# Patient Record
Sex: MALE | Race: OTHER | URBAN - METROPOLITAN AREA
[De-identification: names, ages, dates, MRNs, and addresses within clinical notes are randomized per-mention and may not be internally consistent; named-entity substitution may affect disease eponyms.]

---

## 2017-07-29 ENCOUNTER — EMERGENCY (EMERGENCY)
Facility: HOSPITAL | Age: 32
LOS: 0 days | Discharge: HOME | End: 2017-07-29
Attending: EMERGENCY MEDICINE
Payer: MEDICAID

## 2017-07-29 VITALS
OXYGEN SATURATION: 100 % | RESPIRATION RATE: 18 BRPM | SYSTOLIC BLOOD PRESSURE: 132 MMHG | HEART RATE: 85 BPM | DIASTOLIC BLOOD PRESSURE: 79 MMHG | TEMPERATURE: 99 F

## 2017-07-29 VITALS
RESPIRATION RATE: 18 BRPM | DIASTOLIC BLOOD PRESSURE: 81 MMHG | HEIGHT: 67 IN | TEMPERATURE: 98 F | HEART RATE: 108 BPM | WEIGHT: 160.06 LBS | SYSTOLIC BLOOD PRESSURE: 139 MMHG

## 2017-07-29 DIAGNOSIS — R19.7 DIARRHEA, UNSPECIFIED: ICD-10-CM

## 2017-07-29 DIAGNOSIS — K52.9 NONINFECTIVE GASTROENTERITIS AND COLITIS, UNSPECIFIED: ICD-10-CM

## 2017-07-29 DIAGNOSIS — R10.9 UNSPECIFIED ABDOMINAL PAIN: ICD-10-CM

## 2017-07-29 DIAGNOSIS — R11.2 NAUSEA WITH VOMITING, UNSPECIFIED: ICD-10-CM

## 2017-07-29 LAB
ALBUMIN SERPL ELPH-MCNC: 4.6 G/DL — SIGNIFICANT CHANGE UP (ref 3.3–5)
ALP SERPL-CCNC: 90 U/L — SIGNIFICANT CHANGE UP (ref 40–120)
ALT FLD-CCNC: 46 U/L — SIGNIFICANT CHANGE UP (ref 12–78)
ANION GAP SERPL CALC-SCNC: 11 MMOL/L — SIGNIFICANT CHANGE UP (ref 5–17)
AST SERPL-CCNC: 22 U/L — SIGNIFICANT CHANGE UP (ref 15–37)
BASOPHILS # BLD AUTO: 0.1 K/UL — SIGNIFICANT CHANGE UP (ref 0–0.2)
BASOPHILS NFR BLD AUTO: 0.6 % — SIGNIFICANT CHANGE UP (ref 0–2)
BILIRUB SERPL-MCNC: 1.5 MG/DL — HIGH (ref 0.2–1.2)
BUN SERPL-MCNC: 15 MG/DL — SIGNIFICANT CHANGE UP (ref 7–23)
CALCIUM SERPL-MCNC: 9.3 MG/DL — SIGNIFICANT CHANGE UP (ref 8.5–10.1)
CHLORIDE SERPL-SCNC: 102 MMOL/L — SIGNIFICANT CHANGE UP (ref 96–108)
CO2 SERPL-SCNC: 25 MMOL/L — SIGNIFICANT CHANGE UP (ref 22–31)
CREAT SERPL-MCNC: 1 MG/DL — SIGNIFICANT CHANGE UP (ref 0.5–1.3)
EOSINOPHIL # BLD AUTO: 0.1 K/UL — SIGNIFICANT CHANGE UP (ref 0–0.5)
EOSINOPHIL NFR BLD AUTO: 0.4 % — SIGNIFICANT CHANGE UP (ref 0–6)
GLUCOSE SERPL-MCNC: 119 MG/DL — HIGH (ref 70–99)
HCG SERPL-ACNC: <1 MIU/ML — SIGNIFICANT CHANGE UP
HCT VFR BLD CALC: 50 % — SIGNIFICANT CHANGE UP (ref 39–50)
HGB BLD-MCNC: 17.1 G/DL — HIGH (ref 13–17)
LACTATE SERPL-SCNC: 1.4 MMOL/L — SIGNIFICANT CHANGE UP (ref 0.7–2)
LIDOCAIN IGE QN: 189 U/L — SIGNIFICANT CHANGE UP (ref 73–393)
LYMPHOCYTES # BLD AUTO: 0.5 K/UL — LOW (ref 1–3.3)
LYMPHOCYTES # BLD AUTO: 3.6 % — LOW (ref 13–44)
MCHC RBC-ENTMCNC: 30 PG — SIGNIFICANT CHANGE UP (ref 27–34)
MCHC RBC-ENTMCNC: 34.3 GM/DL — SIGNIFICANT CHANGE UP (ref 32–36)
MCV RBC AUTO: 87.5 FL — SIGNIFICANT CHANGE UP (ref 80–100)
MONOCYTES # BLD AUTO: 0.7 K/UL — SIGNIFICANT CHANGE UP (ref 0–0.9)
MONOCYTES NFR BLD AUTO: 5.6 % — SIGNIFICANT CHANGE UP (ref 2–14)
NEUTROPHILS # BLD AUTO: 12.1 K/UL — HIGH (ref 1.8–7.4)
NEUTROPHILS NFR BLD AUTO: 89.8 % — HIGH (ref 43–77)
PLATELET # BLD AUTO: 276 K/UL — SIGNIFICANT CHANGE UP (ref 150–400)
POTASSIUM SERPL-MCNC: 3.9 MMOL/L — SIGNIFICANT CHANGE UP (ref 3.5–5.3)
POTASSIUM SERPL-SCNC: 3.9 MMOL/L — SIGNIFICANT CHANGE UP (ref 3.5–5.3)
PROT SERPL-MCNC: 8.9 GM/DL — HIGH (ref 6–8.3)
RBC # BLD: 5.7 M/UL — SIGNIFICANT CHANGE UP (ref 4.2–5.8)
RBC # FLD: 11.2 % — SIGNIFICANT CHANGE UP (ref 11–15)
SODIUM SERPL-SCNC: 138 MMOL/L — SIGNIFICANT CHANGE UP (ref 135–145)
WBC # BLD: 13.4 K/UL — HIGH (ref 3.8–10.5)
WBC # FLD AUTO: 13.4 K/UL — HIGH (ref 3.8–10.5)

## 2017-07-29 PROCEDURE — 99284 EMERGENCY DEPT VISIT MOD MDM: CPT

## 2017-07-29 RX ORDER — ONDANSETRON 8 MG/1
8 TABLET, FILM COATED ORAL ONCE
Qty: 0 | Refills: 0 | Status: COMPLETED | OUTPATIENT
Start: 2017-07-29 | End: 2017-07-29

## 2017-07-29 RX ORDER — FAMOTIDINE 10 MG/ML
20 INJECTION INTRAVENOUS ONCE
Qty: 0 | Refills: 0 | Status: COMPLETED | OUTPATIENT
Start: 2017-07-29 | End: 2017-07-29

## 2017-07-29 RX ORDER — SODIUM CHLORIDE 9 MG/ML
2000 INJECTION INTRAMUSCULAR; INTRAVENOUS; SUBCUTANEOUS ONCE
Qty: 0 | Refills: 0 | Status: COMPLETED | OUTPATIENT
Start: 2017-07-29 | End: 2017-07-29

## 2017-07-29 RX ORDER — DIPHENHYDRAMINE HCL 50 MG
50 CAPSULE ORAL ONCE
Qty: 0 | Refills: 0 | Status: COMPLETED | OUTPATIENT
Start: 2017-07-29 | End: 2017-07-29

## 2017-07-29 RX ADMIN — Medication 30 MILLILITER(S): at 21:42

## 2017-07-29 RX ADMIN — SODIUM CHLORIDE 1000 MILLILITER(S): 9 INJECTION INTRAMUSCULAR; INTRAVENOUS; SUBCUTANEOUS at 21:42

## 2017-07-29 RX ADMIN — Medication 50 MILLIGRAM(S): at 21:42

## 2017-07-29 RX ADMIN — ONDANSETRON 8 MILLIGRAM(S): 8 TABLET, FILM COATED ORAL at 21:42

## 2017-07-29 RX ADMIN — FAMOTIDINE 20 MILLIGRAM(S): 10 INJECTION INTRAVENOUS at 21:42

## 2017-07-29 NOTE — ED PROVIDER NOTE - MEDICAL DECISION MAKING DETAILS
Patient with gastroenteritis after eating old chicken.  VSS.  Labs reviewed, no significant findings other than mild leukocytosis.  The risks versus benefits of CT scan were discussed with patient/patient's family.  Based on patient's presentation at this time, it is agreed that emergent imaging with CT scan would do more harm than good due to possible unneeded radiation.  If symptoms should develop or worsen at any point to warrant emergent imaging however, patient is advised to return to the ER or seek medical care immediately.  Discussed results and outcome of today's visit with the patient.  Patient advised to please follow up with their primary care doctor within the next 24 hours and return to the Emergency Department for worsening symptoms or any other concerns.  Patient advised that their doctor may call  to follow up on the specific results of the tests performed today in the emergency department.   Patient appears well on discharge.

## 2017-07-29 NOTE — ED ADULT TRIAGE NOTE - CHIEF COMPLAINT QUOTE
"Vomiting and cramping" Reports having nausea, vomiting, and diarrhea starting after a meal at 1 pm with chills.

## 2017-07-29 NOTE — ED PROVIDER NOTE - OBJECTIVE STATEMENT
Pertinent PMH/PSH/FHx/SHx and Review of Systems contained within:  Patient presents to the ED for nausea, vomiting, diarrhea, and diffuse abdominal cramping x3 hours.  Says that he ate old [facility] meat which was left in his car overnight.  Denies any focal abdominal pain.  +Chills.    Relevant PMHx/SHx/SOCHx/FAMH:  Denies, No PSH  PMD: Dr. Jj  Patient denies EtOH/tobacco/illicit substance use.    ROS: No fever, No headache/photophobia/eye pain/changes in vision, No ear pain/sore throat/dysphagia, No chest pain/palpitations, no SOB/cough/wheeze/stridor, No melena, no dysuria/frequency/discharge, No neck/back pain, no rash, no changes in neurological status/function.

## 2017-07-30 RX ORDER — ONDANSETRON 8 MG/1
1 TABLET, FILM COATED ORAL
Qty: 6 | Refills: 0 | OUTPATIENT
Start: 2017-07-30 | End: 2017-08-01

## 2017-11-15 PROBLEM — Z00.00 ENCOUNTER FOR PREVENTIVE HEALTH EXAMINATION: Status: ACTIVE | Noted: 2017-11-15

## 2017-12-02 ENCOUNTER — OUTPATIENT (OUTPATIENT)
Dept: OUTPATIENT SERVICES | Facility: HOSPITAL | Age: 32
LOS: 1 days | End: 2017-12-02
Payer: MEDICAID

## 2017-12-02 ENCOUNTER — APPOINTMENT (OUTPATIENT)
Dept: UROLOGY | Facility: CLINIC | Age: 32
End: 2017-12-02
Payer: MEDICAID

## 2017-12-02 VITALS — HEART RATE: 62 BPM | DIASTOLIC BLOOD PRESSURE: 78 MMHG | SYSTOLIC BLOOD PRESSURE: 126 MMHG | RESPIRATION RATE: 16 BRPM

## 2017-12-02 PROCEDURE — 99204 OFFICE O/P NEW MOD 45 MIN: CPT | Mod: 25

## 2017-12-02 PROCEDURE — 76870 US EXAM SCROTUM: CPT

## 2017-12-02 PROCEDURE — 93975 VASCULAR STUDY: CPT | Mod: 26

## 2017-12-02 PROCEDURE — 76870 US EXAM SCROTUM: CPT | Mod: 26

## 2017-12-02 PROCEDURE — 93975 VASCULAR STUDY: CPT

## 2017-12-04 LAB
ALBUMIN SERPL ELPH-MCNC: 4.9 G/DL
ALP BLD-CCNC: 82 U/L
ALT SERPL-CCNC: 27 U/L
ANION GAP SERPL CALC-SCNC: 13 MMOL/L
APPEARANCE: CLEAR
AST SERPL-CCNC: 23 U/L
BACTERIA: NEGATIVE
BASOPHILS # BLD AUTO: 0.05 K/UL
BASOPHILS NFR BLD AUTO: 1 %
BILIRUB SERPL-MCNC: 1 MG/DL
BILIRUBIN URINE: NEGATIVE
BLOOD URINE: NEGATIVE
BUN SERPL-MCNC: 11 MG/DL
CALCIUM SERPL-MCNC: 10.5 MG/DL
CHLORIDE SERPL-SCNC: 99 MMOL/L
CHOLEST SERPL-MCNC: 230 MG/DL
CHOLEST/HDLC SERPL: 5.3 RATIO
CO2 SERPL-SCNC: 27 MMOL/L
COLOR: YELLOW
CREAT SERPL-MCNC: 0.82 MG/DL
EOSINOPHIL # BLD AUTO: 0.12 K/UL
EOSINOPHIL NFR BLD AUTO: 2.4 %
ESTRADIOL SERPL-MCNC: 5 PG/ML
FSH SERPL-MCNC: 11.5 IU/L
GLUCOSE QUALITATIVE U: NEGATIVE MG/DL
GLUCOSE SERPL-MCNC: 82 MG/DL
HBA1C MFR BLD HPLC: 5.5 %
HCT VFR BLD CALC: 45.9 %
HDLC SERPL-MCNC: 43 MG/DL
HGB BLD-MCNC: 15.8 G/DL
HYALINE CASTS: 0 /LPF
IMM GRANULOCYTES NFR BLD AUTO: 0.2 %
KETONES URINE: NEGATIVE
LDLC SERPL CALC-MCNC: 155 MG/DL
LEUKOCYTE ESTERASE URINE: NEGATIVE
LH SERPL-ACNC: 5.3 IU/L
LYMPHOCYTES # BLD AUTO: 2.06 K/UL
LYMPHOCYTES NFR BLD AUTO: 40.6 %
MAN DIFF?: NORMAL
MCHC RBC-ENTMCNC: 30.3 PG
MCHC RBC-ENTMCNC: 34.4 GM/DL
MCV RBC AUTO: 88.1 FL
MICROSCOPIC-UA: NORMAL
MONOCYTES # BLD AUTO: 0.36 K/UL
MONOCYTES NFR BLD AUTO: 7.1 %
NEUTROPHILS # BLD AUTO: 2.48 K/UL
NEUTROPHILS NFR BLD AUTO: 48.7 %
NITRITE URINE: NEGATIVE
PH URINE: 7
PLATELET # BLD AUTO: 294 K/UL
POTASSIUM SERPL-SCNC: 4.7 MMOL/L
PROT SERPL-MCNC: 8.1 G/DL
PROTEIN URINE: NEGATIVE MG/DL
RBC # BLD: 5.21 M/UL
RBC # FLD: 12.6 %
RED BLOOD CELLS URINE: 1 /HPF
SODIUM SERPL-SCNC: 139 MMOL/L
SPECIFIC GRAVITY URINE: 1.02
SQUAMOUS EPITHELIAL CELLS: 0 /HPF
TRIGL SERPL-MCNC: 158 MG/DL
TSH SERPL-ACNC: 2.34 UIU/ML
UROBILINOGEN URINE: NEGATIVE MG/DL
WBC # FLD AUTO: 5.08 K/UL
WHITE BLOOD CELLS URINE: 0 /HPF

## 2017-12-06 DIAGNOSIS — I86.1 SCROTAL VARICES: ICD-10-CM

## 2017-12-06 DIAGNOSIS — R35.0 FREQUENCY OF MICTURITION: ICD-10-CM

## 2017-12-11 LAB
CFTR MUT TESTED BLD/T: NORMAL
TESTOST BND SERPL-MCNC: 13.1 PG/ML
TESTOST SERPL-MCNC: 388.5 NG/DL
Y CHROMOSOME MICRODELETION, DNA ANALYSIS: NORMAL

## 2018-01-03 ENCOUNTER — APPOINTMENT (OUTPATIENT)
Dept: HUMAN REPRODUCTION | Facility: CLINIC | Age: 33
End: 2018-01-03

## 2018-01-04 ENCOUNTER — APPOINTMENT (OUTPATIENT)
Dept: UROLOGY | Facility: CLINIC | Age: 33
End: 2018-01-04

## 2020-11-05 ENCOUNTER — APPOINTMENT (OUTPATIENT)
Dept: UROLOGY | Facility: CLINIC | Age: 35
End: 2020-11-05
Payer: MEDICAID

## 2020-11-05 VITALS — TEMPERATURE: 97.8 F

## 2020-11-05 PROCEDURE — 99072 ADDL SUPL MATRL&STAF TM PHE: CPT

## 2020-11-05 PROCEDURE — 99214 OFFICE O/P EST MOD 30 MIN: CPT

## 2020-11-05 NOTE — ASSESSMENT
[FreeTextEntry1] : The patient presents after 3-year hiatus to resume his treatment for fertility.  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  Prior blood studies demonstrated an elevated FSH.  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  Micro-Y deletion, karyotype and CF testing were normal.  He would like to discuss options\par \par Please discussed the procedure ofmicroTESE with the relative risks and benefits including the possibility worsening of his testicular function and in particular his testosterone.  He has questions which were answered.  He would like to proceed with the procedure.\par \par Consultation: 30 minutes:  10 minutes reviewing his history and performing a physical examination.  20 minutes reviewing his prior blood testing and semen analysis and he is newer studies of testis biopsy ,discussing treatment options and writing his note. There was also additional time in preparation for today's visit.\par

## 2020-11-05 NOTE — HISTORY OF PRESENT ILLNESS
[FreeTextEntry1] : The patient presents after 3-year hiatus to resume his treatment for fertility.  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  Prior blood studies demonstrated an elevated FSH.  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  Micro-Y deletion, karyotype and CF testing were normal.  He would like to discuss options\par \par His wife age 23 was not able to accompany him to the visit today. She has not had any prior pregnancies. As I understand her evaluation has been normal to date by evaluation by her gynecologist.\par \par They have been trying to achieve pregnancy for  5 years without conception. His past medical history demonstrates no significant urologic issues (see patient questionnaire). In his present occupation he has no known toxin exposure. He does not smoke and and does not drink. He has no known drug allergies. His review of systems is non-contributory. His family history is not significant.\par \par He has been trying to conceive with his wife for the past 4-5 years. He denies medical problems or prior surgeries. He was evaluated previously by physicians here in the [place] and in [place]. He underwent prior semen analyses demonstrating azoospermia.\par

## 2020-11-11 LAB
25(OH)D3 SERPL-MCNC: 18.5 NG/ML
ESTRADIOL SERPL-MCNC: 14 PG/ML
FSH SERPL-MCNC: 11.6 IU/L
LH SERPL-ACNC: 9.6 IU/L
TESTOST BND SERPL-MCNC: 4.7 PG/ML
TESTOST SERPL-MCNC: 165.5 NG/DL

## 2020-11-19 ENCOUNTER — APPOINTMENT (OUTPATIENT)
Dept: UROLOGY | Facility: CLINIC | Age: 35
End: 2020-11-19
Payer: MEDICAID

## 2020-11-19 PROCEDURE — 99214 OFFICE O/P EST MOD 30 MIN: CPT

## 2020-11-19 NOTE — ASSESSMENT
[FreeTextEntry1] : The patient presents after 3-year hiatus to resume his treatment for fertility.  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  Prior blood studies demonstrated an elevated FSH.  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  Micro-Y deletion, karyotype and CF testing were normal.  He would like to discuss options. Blood studies demonstrated a normal LH but a very low total testosterone of 166 ng/dL Free testosterone is also low at 4.7 pg/mL.  Vitamin D was low at 18.5 ng/mL\par \par We discussed the procedure of microTESE with the relative risks and benefits including the possibility worsening of his testicular function and in particular his testosterone.  He understands that sperm may not be found and any sperm found might not be effective in vitro fertilization and single sperm injection.  He has questions which were answered.  He would like to proceed with the procedure.  I have started him on clomiphene citrate 25 mg a day he will obtain blood studies in 3 weeks and we will discuss the results in 5 weeks by telehealth.\par \par Consultation: 30 minutes:  10 minutes reviewing his history and performing a physical examination.  20 minutes reviewing the ultrasound, writing for prescription medications and blood studies ,discussing treatment options and writing his note. There was also additional time in preparation for today's visit.

## 2020-12-08 ENCOUNTER — APPOINTMENT (OUTPATIENT)
Dept: UROLOGY | Facility: CLINIC | Age: 35
End: 2020-12-08

## 2020-12-08 NOTE — ASSESSMENT
[FreeTextEntry1] : The patient presents to discuss his recent blood studies and options for therapy..  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  Previously, blood studies demonstrated an elevated FSH.  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  Micro-Y deletion, karyotype and CF testing were normal.  He would like to discuss options. Blood studies again showed an elevated FSH of 11.6 not much different than 11.5 IU/L in 2017.  His LH was 9.6 IUs/L slightly elevated from the 5.3 IU/L in 2017.  His testosterone both free and total were extremely low with his free testosterone being 4.7 pg/mL and total testosterone 165.5 ng/dL.  A DEXA scan is recommended.\par \par We again discussed the procedure of microTESE with the relative risks and benefits including the possibility worsening of his testicular function and in particular his testosterone.  He understands that sperm may not be found and any sperm found might not be effective in vitro fertilization and single sperm injection.  He has questions which were answered.  He would like to proceed with the procedure.  I have started him on clomiphene citrate 25 mg a day he will obtain blood studies in 3 weeks and we will discuss the results in 5 weeks by telehealth.\par \par Consultation: 30 minutes:  10 minutes reviewing his history and performing a physical examination.  20 minutes reviewing the ultrasound, writing for prescription medications and blood studies ,discussing treatment options and writing his note. There was also additional time in preparation for today's visit.

## 2020-12-08 NOTE — HISTORY OF PRESENT ILLNESS
[FreeTextEntry1] : The patient presents to discuss his recent blood studies and options for therapy..  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  Previously, blood studies demonstrated an elevated FSH.  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  Micro-Y deletion, karyotype and CF testing were normal.  He would like to discuss options\par \par His wife age 23 was not able to accompany him to the visit today. She has not had any prior pregnancies. As I understand her evaluation has been normal to date by evaluation by her gynecologist.\par \par They have been trying to achieve pregnancy for  5 years without conception. His past medical history demonstrates no significant urologic issues (see patient questionnaire). In his present occupation he has no known toxin exposure. He does not smoke and and does not drink. He has no known drug allergies. His review of systems is non-contributory. His family history is not significant.\par \par He has been trying to conceive with his wife for the past 4-5 years. He denies medical problems or prior surgeries. He was evaluated previously by physicians here in the [place] and in [place]. He underwent prior semen analyses demonstrating azoospermia.\par

## 2021-01-26 ENCOUNTER — APPOINTMENT (OUTPATIENT)
Dept: UROLOGY | Facility: CLINIC | Age: 36
End: 2021-01-26

## 2021-02-04 ENCOUNTER — APPOINTMENT (OUTPATIENT)
Dept: UROLOGY | Facility: CLINIC | Age: 36
End: 2021-02-04
Payer: MEDICAID

## 2021-02-04 PROCEDURE — 99214 OFFICE O/P EST MOD 30 MIN: CPT

## 2021-02-04 PROCEDURE — 99072 ADDL SUPL MATRL&STAF TM PHE: CPT

## 2021-02-04 NOTE — HISTORY OF PRESENT ILLNESS
[FreeTextEntry1] : The patient returns for follow-up to review his blood studies and discuss options.  He had started the clomiphene citrate but stopped it a month ago.  He stated his medical doctor had a blood study that showed more than doubling of his testosterone. \par \par PMH: The patient presented after 3-year hiatus to resume his treatment for fertility.  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  Prior blood studies demonstrated an elevated FSH.  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  Micro-Y deletion, karyotype and CF testing were normal.  He would like to discuss options\par \par His wife age 23 was not able to accompany him to the visit today. She has not had any prior pregnancies. As I understand her evaluation has been normal to date by evaluation by her gynecologist.\par \par They have been trying to achieve pregnancy for  5 years without conception. His past medical history demonstrates no significant urologic issues (see patient questionnaire). In his present occupation he has no known toxin exposure. He does not smoke and and does not drink. He has no known drug allergies. His review of systems is non-contributory. His family history is not significant.\par \par He has been trying to conceive with his wife for the past 4-5 years. He denies medical problems or prior surgeries. He was evaluated previously by physicians here in the [place] and in [place]. He underwent prior semen analyses demonstrating azoospermia.\par

## 2021-02-04 NOTE — ASSESSMENT
[FreeTextEntry1] : The patient returns for follow-up to review his blood studies and discuss options.  He had started the clomiphene citrate but stopped it a month ago.  He stated his medical doctor had a blood study that showed more than doubling of his testosterone.  I have restarted him on clomiphene citrate 25 mg a day and have obtained blood studies today.  I will see him back in 3 weeks and obtain repeat blood studies while on clomiphene citrate in addition.  We also obtained genetic testing which for some reason was not done last time as requested.\par \par  Prior blood studies demonstrated an elevated FSH.  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  Micro-Y deletion, karyotype and CF testing were normal.  He would like to discuss options. Blood studies demonstrated a normal LH but a very low total testosterone of 166 ng/dL Free testosterone is also low at 4.7 pg/mL.  Vitamin D was low at 18.5 ng/mL\par \par We discussed the procedure of microTESE with the relative risks and benefits including the possibility worsening of his testicular function and in particular his testosterone.  He understands that sperm may not be found and any sperm found might not be effective in vitro fertilization and single sperm injection.  He has questions which were answered.  He would like to proceed with the procedure.  He also inquired about use of his brother as a directed donor.  We will discuss this further when he returns in 3 weeks. \par \par Consultation: 30 minutes:  10 minutes reviewing his history and performing a physical examination.  20 minutes reviewing his prior evaluation and need for blood testing today, writing for prescription medications and blood studies ,discussing treatment options and writing his note. There was also additional time in preparation for today's visit.\par

## 2021-02-09 LAB
25(OH)D3 SERPL-MCNC: 16.9 NG/ML
BASOPHILS # BLD AUTO: 0.07 K/UL
BASOPHILS NFR BLD AUTO: 1.2 %
EOSINOPHIL # BLD AUTO: 0.09 K/UL
EOSINOPHIL NFR BLD AUTO: 1.6 %
ESTRADIOL SERPL-MCNC: 9 PG/ML
HCT VFR BLD CALC: 49.2 %
HGB BLD-MCNC: 15.9 G/DL
IMM GRANULOCYTES NFR BLD AUTO: 0.2 %
LH SERPL-ACNC: 6.3 IU/L
LYMPHOCYTES # BLD AUTO: 2.06 K/UL
LYMPHOCYTES NFR BLD AUTO: 36.2 %
MAN DIFF?: NORMAL
MCHC RBC-ENTMCNC: 29.1 PG
MCHC RBC-ENTMCNC: 32.3 GM/DL
MCV RBC AUTO: 89.9 FL
MONOCYTES # BLD AUTO: 0.35 K/UL
MONOCYTES NFR BLD AUTO: 6.2 %
NEUTROPHILS # BLD AUTO: 3.11 K/UL
NEUTROPHILS NFR BLD AUTO: 54.6 %
PLATELET # BLD AUTO: 339 K/UL
PROLACTIN SERPL-MCNC: 13.9 NG/ML
RBC # BLD: 5.47 M/UL
RBC # FLD: 12.1 %
WBC # FLD AUTO: 5.69 K/UL

## 2021-02-10 LAB
TESTOST BND SERPL-MCNC: 4.8 PG/ML
TESTOST SERPL-MCNC: 206.1 NG/DL

## 2021-02-14 LAB
CFTR MUT TESTED BLD/T: NEGATIVE
Y CHROMOSOME MICRODELETION, DNA ANALYSIS: NORMAL

## 2021-03-02 ENCOUNTER — APPOINTMENT (OUTPATIENT)
Dept: UROLOGY | Facility: CLINIC | Age: 36
End: 2021-03-02
Payer: MEDICAID

## 2021-03-02 PROCEDURE — 99214 OFFICE O/P EST MOD 30 MIN: CPT

## 2021-03-02 PROCEDURE — 99072 ADDL SUPL MATRL&STAF TM PHE: CPT

## 2021-03-02 RX ORDER — CLOMIPHENE CITRATE 50 MG/1
50 TABLET ORAL DAILY
Qty: 45 | Refills: 1 | Status: ACTIVE | COMMUNITY
Start: 2020-11-19 | End: 1900-01-01

## 2021-03-02 NOTE — ASSESSMENT
[FreeTextEntry1] : The patient returns for follow-up to review his blood studies and discuss options.  He had started back on clomiphene citrate 25 mg 1 month prior.  Blood test to be reviewed and there is taken prior to restarting clomiphene citrate.  His genetic testing including karyotype, micro-Y deletion and cystic fibrosis were all normal.  I have requested repeat blood studies for today.\par \par Prior blood studies demonstrated a high normal FSH.  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  Micro-Y deletion, karyotype and CF testing were normal.  Blood studies demonstrated a normal LH but a very low total testosterone of 166 ng/dL Free testosterone is also low at 4.7 pg/mL.  Vitamin D was low at 18.5 ng/mL we discussed supplementation.\par \par We discussed the procedure of both a regular TESE and a  microTESE with the relative risks and benefits including the possibility worsening of his testicular function and in particular his testosterone.  He understands that sperm may not be found and any sperm found might not be effective in vitro fertilization and single sperm injection.  He has questions which were answered.  He would like to proceed with the procedure.  He is most inclined to go forward with a regular TESE. He also inquired about use of his brother as a directed donor.  We will discuss this further when I discussed the repeat blood studies with him in 2 weeks.\par \par Consultation: 30 minutes:  10 minutes reviewing his history and performing a physical examination.  20 minutes reviewing his prior evaluation, writing for prescription medications and blood studies ,discussing treatment options and writing his note. There was also additional time in preparation for today's visit.\par \par Shine bui 3 weeks on clomiphene citrate, TESE

## 2021-03-02 NOTE — HISTORY OF PRESENT ILLNESS
[FreeTextEntry1] : The patient returns for follow-up to review his blood studies and discuss options.  He had started back on clomiphene citrate 25 mg 1 month prior.  Blood test to be reviewed and there is taken prior to restarting clomiphene citrate.\par \par PMH: The patient presented after 3-year hiatus to resume his treatment for fertility.  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  Prior blood studies demonstrated an elevated FSH.  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  Micro-Y deletion, karyotype and CF testing were normal.  He would like to discuss options\par \par His wife age 23 was not able to accompany him to the visit today. She has not had any prior pregnancies. As I understand her evaluation has been normal to date by evaluation by her gynecologist.\par \par They have been trying to achieve pregnancy for  5 years without conception. His past medical history demonstrates no significant urologic issues (see patient questionnaire). In his present occupation he has no known toxin exposure. He does not smoke and and does not drink. He has no known drug allergies. His review of systems is non-contributory. His family history is not significant.\par \par He has been trying to conceive with his wife for the past 4-5 years. He denies medical problems or prior surgeries. He was evaluated previously by physicians here in the [place] and in [place]. He underwent prior semen analyses demonstrating azoospermia.\par

## 2021-03-03 LAB
BASOPHILS # BLD AUTO: 0.07 K/UL
BASOPHILS NFR BLD AUTO: 1.2 %
EOSINOPHIL # BLD AUTO: 0.08 K/UL
EOSINOPHIL NFR BLD AUTO: 1.3 %
ESTRADIOL SERPL-MCNC: 29 PG/ML
FSH SERPL-MCNC: 21.3 IU/L
HCT VFR BLD CALC: 49 %
HGB BLD-MCNC: 15.6 G/DL
IMM GRANULOCYTES NFR BLD AUTO: 0.2 %
LH SERPL-ACNC: 11.5 IU/L
LYMPHOCYTES # BLD AUTO: 2.34 K/UL
LYMPHOCYTES NFR BLD AUTO: 38.6 %
MAN DIFF?: NORMAL
MCHC RBC-ENTMCNC: 28.9 PG
MCHC RBC-ENTMCNC: 31.8 GM/DL
MCV RBC AUTO: 90.9 FL
MONOCYTES # BLD AUTO: 0.42 K/UL
MONOCYTES NFR BLD AUTO: 6.9 %
NEUTROPHILS # BLD AUTO: 3.15 K/UL
NEUTROPHILS NFR BLD AUTO: 51.8 %
PLATELET # BLD AUTO: 313 K/UL
PROLACTIN SERPL-MCNC: 14.1 NG/ML
RBC # BLD: 5.39 M/UL
RBC # FLD: 12.3 %
WBC # FLD AUTO: 6.07 K/UL

## 2021-03-11 LAB
TESTOST BND SERPL-MCNC: 17.3 PG/ML
TESTOST SERPL-MCNC: 600.3 NG/DL

## 2021-03-15 ENCOUNTER — APPOINTMENT (OUTPATIENT)
Dept: UROLOGY | Facility: CLINIC | Age: 36
End: 2021-03-15
Payer: MEDICAID

## 2021-03-15 DIAGNOSIS — I86.1 SCROTAL VARICES: ICD-10-CM

## 2021-03-15 DIAGNOSIS — N46.01 ORGANIC AZOOSPERMIA: ICD-10-CM

## 2021-03-15 PROCEDURE — 99214 OFFICE O/P EST MOD 30 MIN: CPT | Mod: 95

## 2021-03-15 NOTE — ASSESSMENT
[FreeTextEntry1] : The patient returns with his wife for follow-up to review his blood studies and discuss options.  He had started back on clomiphene citrate 25 mg 1 month prior.  His genetic testing including karyotype, micro-Y deletion and cystic fibrosis were all normal. Blood tests taken after 1 month of clomiphene citrate 25 mg a day demonstrated a doubling of LH to 11.5 IU/L.  Estradiol also increased from 9 to 29 pg/mL.  Prolactin remained constant at 14.1 ng/mL.  His FSH also increased from 11.6 to 21.3 international units/L.  His testosterone both free and total increased to 17.3 pg/mL for free testosterone and to 600.3 ng/dL for total testosterone. \par \par We discussed the procedure of both a regular TESE and a  microTESE with the relative risks and benefits including the possibility worsening of his testicular function and in particular his testosterone.  He understands that sperm may not be found and any sperm found might not be effective in vitro fertilization and single sperm injection.  He has questions which were answered.  He would like to proceed with the procedure.  He is most inclined to go forward with a regular TESE. \par \par He also inquired about use of his brother as a directed donor.  However, his brother is in [place].  He will continue on clomiphene citrate and likely go forward with bilateral testis biopsy for diagnosis, sperm retrieval and cryopreservation.\par \par Telehealth Consultation: 30 minutes  10 minutes reviewing his history and discussing prior results.  20 minutes discussing various treatment options and writing his note. There was also additional time in preparing for the visit and assisting the patient with technology issues he was having with the telehealth platform.\par \par

## 2021-03-15 NOTE — HISTORY OF PRESENT ILLNESS
[FreeTextEntry1] : The patient-doctor. relationship has been established in a face-to-face fashion on real-time video audio HIPAA compliant communication using telemedicine software.  The patient's identity has been confirmed.  The patient was previously emailed a copy of the telemedicine consent.  The patient has had a chance to review and has now given verbal consent and has requested care to be assessed and treated through telemedicine. The patient understands there may be limitations in this process and that they need not need further follow-up care in the office and/or hospital settings. We were unable to use the [employer] platform and an alternative platform was utilized.\par \par Verbal consent was given on Monday, March 15, 2021 at 2:30 PM by the patient.  It was requested by the physician.  A written consent was previously sent for the patient to sign and return.\par \par The patient returns with his wife for follow-up to review his blood studies and discuss options.  He had started back on clomiphene citrate 25 mg 1 month prior.\par \par PMH: The patient presented after 3-year hiatus to resume his treatment for fertility.  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  Prior blood studies demonstrated an elevated FSH.  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  Micro-Y deletion, karyotype and CF testing were normal.  He would like to discuss options\par \par His wife age 23 was not able to accompany him to the visit today. She has not had any prior pregnancies. As I understand her evaluation has been normal to date by evaluation by her gynecologist.\par \par They have been trying to achieve pregnancy for  5 years without conception. His past medical history demonstrates no significant urologic issues (see patient questionnaire). In his present occupation he has no known toxin exposure. He does not smoke and and does not drink. He has no known drug allergies. His review of systems is non-contributory. His family history is not significant.\par \par He has been trying to conceive with his wife for the past 4-5 years. He denies medical problems or prior surgeries. He was evaluated previously by physicians here in the [place] and in [place]. He underwent prior semen analyses demonstrating azoospermia.\par